# Patient Record
Sex: MALE | Race: WHITE
[De-identification: names, ages, dates, MRNs, and addresses within clinical notes are randomized per-mention and may not be internally consistent; named-entity substitution may affect disease eponyms.]

---

## 2022-01-10 ENCOUNTER — HOSPITAL ENCOUNTER (OUTPATIENT)
Dept: HOSPITAL 11 - JP.SDS | Age: 77
Discharge: HOME | End: 2022-01-10
Attending: SURGERY
Payer: MEDICARE

## 2022-01-10 VITALS — SYSTOLIC BLOOD PRESSURE: 135 MMHG | DIASTOLIC BLOOD PRESSURE: 63 MMHG | HEART RATE: 52 BPM

## 2022-01-10 DIAGNOSIS — Z01.812: ICD-10-CM

## 2022-01-10 DIAGNOSIS — Z86.010: ICD-10-CM

## 2022-01-10 DIAGNOSIS — Z12.11: Primary | ICD-10-CM

## 2022-01-10 DIAGNOSIS — K57.30: ICD-10-CM

## 2022-01-10 DIAGNOSIS — Z20.822: ICD-10-CM

## 2022-01-10 DIAGNOSIS — K64.9: ICD-10-CM

## 2022-01-10 LAB — SARS-COV-2 RNA RESP QL NAA+PROBE: NEGATIVE

## 2022-01-10 PROCEDURE — 0241U: CPT

## 2022-01-11 NOTE — OR
DATE OF PROCEDURE:  01/10/2022

 

SURGEON:  Georges Hooper MD

 

PREOPERATIVE DIAGNOSIS:  History of colon polyps.

 

POSTOPERATIVE DIAGNOSIS:  Extensive left colonic diverticulosis without recurrent polyps

identified.

 

OPERATIVE PROCEDURE:  Flexible colonoscopy.

 

ANESTHESIA:  IV sedation.

 

INDICATION FOR PROCEDURE:  A 76-year-old male presenting with history of some colon polyps

being removed 8 years ago.  He is here for followup screening colonoscopy.  Potential risks

of the procedure including bleeding and perforation were discussed and the patient wishes to

proceed.

 

DETAILS OF THE PROCEDURE:  The patient was taken to the operating room and placed in left

lateral decubitus position.  IV sedation was administered after which the initial digital

rectal exam was performed and was unremarkable.  Colonoscope was then passed into the rectum

with retroflexion revealing uncomplicated hemorrhoidal columns.  Scope was eventually passed

to the level of the cecum.  The prep was quite good.  The patient had quite extensive left

colonic diverticulosis, but the diverticulosis otherwise was uncomplicated.  Apart from

that, there were no areas of colitis and no polyps or other signs of neoplasia.  The scope

was then withdrawn and the procedure was then concluded.  The patient tolerated the

procedure well

 

With the history of colon polyps, the patient should be considered for a repeat colonoscopy

in 5 years at age 81.  He remains in otherwise fairly good health.

 

 

 

 

Georges Hooper MD

DD:  01/11/2022 13:46:32

DT:  01/11/2022 21:55:09

Job #:  [id number]